# Patient Record
Sex: MALE | Race: WHITE | NOT HISPANIC OR LATINO | Employment: STUDENT | URBAN - METROPOLITAN AREA
[De-identification: names, ages, dates, MRNs, and addresses within clinical notes are randomized per-mention and may not be internally consistent; named-entity substitution may affect disease eponyms.]

---

## 2023-09-05 PROBLEM — H52.10 MYOPIA: Status: ACTIVE | Noted: 2023-09-05

## 2023-12-26 ENCOUNTER — APPOINTMENT (OUTPATIENT)
Dept: OPHTHALMOLOGY | Facility: CLINIC | Age: 18
End: 2023-12-26
Payer: COMMERCIAL

## 2023-12-26 ENCOUNTER — OFFICE VISIT (OUTPATIENT)
Dept: OPHTHALMOLOGY | Facility: CLINIC | Age: 18
End: 2023-12-26
Payer: COMMERCIAL

## 2023-12-26 ENCOUNTER — CLINICAL SUPPORT (OUTPATIENT)
Dept: OPHTHALMOLOGY | Facility: CLINIC | Age: 18
End: 2023-12-26
Payer: COMMERCIAL

## 2023-12-26 DIAGNOSIS — H01.00A BLEPHARITIS OF UPPER AND LOWER EYELIDS OF BOTH EYES, UNSPECIFIED TYPE: ICD-10-CM

## 2023-12-26 DIAGNOSIS — H52.213 IRREGULAR ASTIGMATISM, BILATERAL: ICD-10-CM

## 2023-12-26 DIAGNOSIS — H01.00B BLEPHARITIS OF UPPER AND LOWER EYELIDS OF BOTH EYES, UNSPECIFIED TYPE: ICD-10-CM

## 2023-12-26 DIAGNOSIS — H52.13 MYOPIA OF BOTH EYES: Primary | ICD-10-CM

## 2023-12-26 PROCEDURE — 99214 OFFICE O/P EST MOD 30 MIN: CPT | Performed by: OPHTHALMOLOGY

## 2023-12-26 PROCEDURE — 92015 DETERMINE REFRACTIVE STATE: CPT | Performed by: OPHTHALMOLOGY

## 2023-12-26 RX ORDER — ALBUTEROL SULFATE 90 UG/1
2 AEROSOL, METERED RESPIRATORY (INHALATION)
COMMUNITY
Start: 2023-08-08

## 2023-12-26 RX ORDER — MONTELUKAST SODIUM 10 MG/1
10 TABLET ORAL
COMMUNITY
Start: 2022-06-17

## 2023-12-26 ASSESSMENT — ENCOUNTER SYMPTOMS
EYES NEGATIVE: 0
CARDIOVASCULAR NEGATIVE: 0
PSYCHIATRIC NEGATIVE: 0
OCCASIONAL FEELINGS OF UNSTEADINESS: 0
MUSCULOSKELETAL NEGATIVE: 0
ALLERGIC/IMMUNOLOGIC NEGATIVE: 0
HEMATOLOGIC/LYMPHATIC NEGATIVE: 0
LOSS OF SENSATION IN FEET: 0
NEUROLOGICAL NEGATIVE: 0
ENDOCRINE NEGATIVE: 0
DEPRESSION: 0
GASTROINTESTINAL NEGATIVE: 0
CONSTITUTIONAL NEGATIVE: 0
RESPIRATORY NEGATIVE: 0

## 2023-12-26 ASSESSMENT — REFRACTION_WEARINGRX
SPECS_TYPE: SVL
OD_AXIS: 016
OS_CYLINDER: -4.50
OD_CYLINDER: -4.00
OD_SPHERE: +0.25
OS_AXIS: 176
OS_SPHERE: +2.50

## 2023-12-26 ASSESSMENT — VISUAL ACUITY
OD_SC: 20/20
OS_SC: 20/20
CORRECTION_TYPE: GLASSES
METHOD: SNELLEN - SINGLE

## 2023-12-26 ASSESSMENT — KERATOMETRY
OD_AXISANGLE2_DEGREES: 5
OS_K1POWER_DIOPTERS: 42.50
OS_AXISANGLE2_DEGREES: 175
OD_K1POWER_DIOPTERS: 42.50
METHOD_AUTO_MANUAL: AUTOMATED
OS_AXISANGLE_DEGREES: 85
OS_K2POWER_DIOPTERS: 46.50
OD_AXISANGLE_DEGREES: 95
OD_K2POWER_DIOPTERS: 45.75

## 2023-12-26 ASSESSMENT — REFRACTION_MANIFEST
OS_SPHERE: +2.50
OS_AXIS: 175
OD_CYLINDER: -3.50
OS_CYLINDER: -4.75
OD_SPHERE: +0.25
OD_AXIS: 010

## 2023-12-26 ASSESSMENT — TONOMETRY
IOP_METHOD: GOLDMANN APPLANATION
OS_IOP_MMHG: 15
OD_IOP_MMHG: 15

## 2023-12-26 ASSESSMENT — EXTERNAL EXAM - LEFT EYE: OS_EXAM: NORMAL

## 2023-12-26 ASSESSMENT — CUP TO DISC RATIO
OS_RATIO: 0.3
OD_RATIO: 0.3

## 2023-12-26 ASSESSMENT — EXTERNAL EXAM - RIGHT EYE: OD_EXAM: NORMAL

## 2023-12-26 ASSESSMENT — PATIENT HEALTH QUESTIONNAIRE - PHQ9
2. FEELING DOWN, DEPRESSED OR HOPELESS: NOT AT ALL
SUM OF ALL RESPONSES TO PHQ9 QUESTIONS 1 AND 2: 0
1. LITTLE INTEREST OR PLEASURE IN DOING THINGS: NOT AT ALL

## 2023-12-26 ASSESSMENT — SLIT LAMP EXAM - LIDS
COMMENTS: 1+ BLEPHARITIS
COMMENTS: 1+ BLEPHARITIS

## 2023-12-26 ASSESSMENT — PAIN SCALES - GENERAL: PAINLEVEL: 0-NO PAIN

## 2023-12-26 NOTE — PROGRESS NOTES
Subjective   Patient ID: Girish Kincaid is a 18 y.o. male.    Chief Complaint    Annual Exam       HPI    No visual acuity (VA) complaints    Complete exam.  No new changes in health history or meds.  Vision is good and stable.  No new problems or complaints.      Last edited by Oh Moss MD on 12/26/2023  3:14 PM.        No current outpatient medications on file. (Ophthalmology pharm classes)       Current Outpatient Medications (Other)   Medication Sig Dispense Refill    albuterol 90 mcg/actuation inhaler Inhale 2 puffs.      montelukast (Singulair) 10 mg tablet Take 1 tablet (10 mg) by mouth.         Objective   Base Eye Exam       Visual Acuity (Snellen - Single)         Right Left    Dist sc 20/20 20/20      Correction: Glasses              Tonometry (Goldmann Applanation, 2:15 PM)         Right Left    Pressure 15 15              Pupils         Dark Shape React APD    Right 4 Round 2 None    Left 4 Round 2 None              Extraocular Movement         Right Left     Full Full              Dilation       Both eyes: 1% Tropic 2.5% Phen @ 2:15 PM                  Additional Tests       Keratometry (Automated)         K1 Axis K2 Axis    Right 42.50 5 45.75 95    Left 42.50 175 46.50 85                  Slit Lamp and Fundus Exam       External Exam         Right Left    External Normal Normal              Slit Lamp Exam         Right Left    Lids/Lashes 1+ Blepharitis 1+ Blepharitis    Conjunctiva/Sclera normal bulbar and palepbral conjunctiva normal bulbar and palepbral conjunctiva    Cornea normal epi/stroma/endo and tear film normal epi/stroma/endo and tear film    Anterior Chamber normal anterior chamber, deep and quiet normal anterior chamber, deep and quiet    Iris iris normal iris normal    Lens normal clear lens capsule/cortex/nucleus normal clear lens capsule/cortex/nucleus    Anterior Vitreous vitreous clear and normal vitreous clear and normal              Fundus Exam         Right  Left    Disc normal optic nerve normal optic nerve    C/D Ratio 0.3 0.3    Macula normal macula normal macula    Vessels normal retinal vessels normal retinal vessels    Periphery normal retinal periphery normal retinal periphery    Very brief glimpse OU.                  Refraction       Wearing Rx         Sphere Cylinder Axis    Right +0.25 -4.00 016    Left +2.50 -4.50 176      Type: SVL              Manifest Refraction         Sphere Cylinder Axis    Right +0.25 -3.50 010    Left +2.50 -4.75 175              Final Rx         Sphere Cylinder [place] Dist VA    Right -0.00 -3.75 010 20/20    Left +2.50 -4.50 170 20/20      Type: distance    Expiration Date: 12/26/2025    Pupillary Distance: 62                    Assessment/Plan   Problem List Items Addressed This Visit          Eye/Vision problems    Myopia - Primary    Irregular astigmatism, bilateral    Blepharitis of upper and lower eyelids of both eyes

## 2024-12-30 ENCOUNTER — APPOINTMENT (OUTPATIENT)
Dept: OPHTHALMOLOGY | Facility: CLINIC | Age: 19
End: 2024-12-30
Payer: COMMERCIAL

## 2024-12-30 DIAGNOSIS — H01.02B SQUAMOUS BLEPHARITIS OF UPPER AND LOWER EYELIDS OF BOTH EYES: Primary | ICD-10-CM

## 2024-12-30 DIAGNOSIS — H52.31 ANISOMETROPIA: ICD-10-CM

## 2024-12-30 DIAGNOSIS — H01.02A SQUAMOUS BLEPHARITIS OF UPPER AND LOWER EYELIDS OF BOTH EYES: Primary | ICD-10-CM

## 2024-12-30 DIAGNOSIS — H52.213 IRREGULAR ASTIGMATISM, BILATERAL: ICD-10-CM

## 2024-12-30 DIAGNOSIS — H52.13 MYOPIA OF BOTH EYES: ICD-10-CM

## 2024-12-30 PROCEDURE — 99214 OFFICE O/P EST MOD 30 MIN: CPT | Performed by: OPHTHALMOLOGY

## 2024-12-30 PROCEDURE — 92015 DETERMINE REFRACTIVE STATE: CPT | Performed by: OPHTHALMOLOGY

## 2024-12-30 ASSESSMENT — REFRACTION_MANIFEST
OD_SPHERE: -0.25
METHOD_AUTOREFRACTION: 1
OD_SPHERE: -0.25
OD_CYLINDER: -3.50
OS_CYLINDER: -4.75
OS_SPHERE: +2.50
OS_AXIS: 175
OS_AXIS: 175
OD_CYLINDER: -3.50
OD_AXIS: 010
OS_CYLINDER: -4.50
OS_SPHERE: +2.00
OD_AXIS: 010

## 2024-12-30 ASSESSMENT — SLIT LAMP EXAM - LIDS
COMMENTS: 1+ BLEPHARITIS
COMMENTS: 1+ BLEPHARITIS

## 2024-12-30 ASSESSMENT — KERATOMETRY
OS_K2POWER_DIOPTERS: 47.00
OD_K1POWER_DIOPTERS: 42.75
OS_AXISANGLE_DEGREES: 85
OS_AXISANGLE2_DEGREES: 175
OD_K2POWER_DIOPTERS: 45.75
OD_AXISANGLE_DEGREES: 95
OD_AXISANGLE2_DEGREES: 5
OS_K1POWER_DIOPTERS: 42.75

## 2024-12-30 ASSESSMENT — CUP TO DISC RATIO
OS_RATIO: 0.3
OD_RATIO: 0.3

## 2024-12-30 ASSESSMENT — VISUAL ACUITY
OS_CC: 20/20
OS_CC+: -2
CORRECTION_TYPE: GLASSES
METHOD: SNELLEN - LINEAR
OD_CC: 20/20

## 2024-12-30 ASSESSMENT — ENCOUNTER SYMPTOMS
NEUROLOGICAL NEGATIVE: 0
PSYCHIATRIC NEGATIVE: 0
HEMATOLOGIC/LYMPHATIC NEGATIVE: 0
RESPIRATORY NEGATIVE: 0
EYES NEGATIVE: 0
ENDOCRINE NEGATIVE: 0
GASTROINTESTINAL NEGATIVE: 0
ALLERGIC/IMMUNOLOGIC NEGATIVE: 0
CARDIOVASCULAR NEGATIVE: 0
MUSCULOSKELETAL NEGATIVE: 0
CONSTITUTIONAL NEGATIVE: 0

## 2024-12-30 ASSESSMENT — TONOMETRY
IOP_METHOD: GOLDMANN APPLANATION
OS_IOP_MMHG: 12
OD_IOP_MMHG: 10

## 2024-12-30 ASSESSMENT — PATIENT HEALTH QUESTIONNAIRE - PHQ9
1. LITTLE INTEREST OR PLEASURE IN DOING THINGS: NOT AT ALL
SUM OF ALL RESPONSES TO PHQ9 QUESTIONS 1 AND 2: 0
2. FEELING DOWN, DEPRESSED OR HOPELESS: NOT AT ALL

## 2024-12-30 ASSESSMENT — REFRACTION_WEARINGRX
OD_SPHERE: +0.00
OD_CYLINDER: -3.75
OD_AXIS: 003
OS_CYLINDER: -4.50
OS_AXIS: 162
SPECS_TYPE: DISTANCE
OS_SPHERE: +2.50

## 2024-12-30 ASSESSMENT — PAIN SCALES - GENERAL: PAINLEVEL_OUTOF10: 0-NO PAIN

## 2024-12-30 ASSESSMENT — EXTERNAL EXAM - RIGHT EYE: OD_EXAM: NORMAL

## 2024-12-30 ASSESSMENT — EXTERNAL EXAM - LEFT EYE: OS_EXAM: NORMAL

## 2024-12-30 NOTE — PROGRESS NOTES
Subjective   Patient ID: Girish Kincaid is a 19 y.o. male.    Chief Complaint    Annual Exam       HPI    Complete exam.  No new problems or complaints.  Vision is excellent.  No changes in health history or meds.   Last edited by Oh Moss MD on 12/30/2024  4:11 PM.        No current outpatient medications on file. (Ophthalmology pharm classes)       Current Outpatient Medications (Other)   Medication Sig Dispense Refill    albuterol 90 mcg/actuation inhaler Inhale 2 puffs.      montelukast (Singulair) 10 mg tablet Take 1 tablet (10 mg) by mouth.         Objective   Base Eye Exam       Visual Acuity (Snellen - Linear)         Right Left    Dist cc 20/20 20/20 -2      Correction: Glasses              Tonometry (Goldmann Applanation, 2:50 PM)         Right Left    Pressure 10 12              Pupils         Dark Shape React APD    Right 4 Round 1 None    Left 4 Round 1 None              Extraocular Movement         Right Left     Full Full              Dilation       Both eyes: 1% Tropic 2.5% Phen @ 2:50 PM                  Additional Tests       Keratometry         K1 Axis K2 Axis    Right 42.75 5 45.75 95    Left 42.75 175 47.00 85                  Slit Lamp and Fundus Exam       External Exam         Right Left    External Normal Normal              Slit Lamp Exam         Right Left    Lids/Lashes 1+ Blepharitis 1+ Blepharitis    Conjunctiva/Sclera normal bulbar and palepbral conjunctiva normal bulbar and palepbral conjunctiva    Cornea normal epi/stroma/endo and tear film normal epi/stroma/endo and tear film    Anterior Chamber normal anterior chamber, deep and quiet normal anterior chamber, deep and quiet    Iris iris normal iris normal    Lens normal clear lens capsule/cortex/nucleus normal clear lens capsule/cortex/nucleus    Anterior Vitreous vitreous clear and normal vitreous clear and normal              Fundus Exam         Right Left    Disc normal optic nerve normal optic nerve    C/D  Ratio 0.3 0.3    Macula normal macula normal macula    Vessels normal retinal vessels normal retinal vessels    Periphery normal retinal periphery normal retinal periphery                  Refraction       Wearing Rx         Sphere Cylinder Axis    Right +0.00 -3.75 003    Left +2.50 -4.50 162      Type: DISTANCE              Manifest Refraction (Auto)         Sphere Cylinder Axis Dist VA    Right -0.25 -3.50 010     Left +2.50 -4.75 175               Manifest Refraction #2 (Subjective)         Sphere Cylinder Axis Dist VA    Right -0.25 -3.50 010 20/20    Left +2.00 -4.50 175 20/20      Pupillary Distance: 61              Final Rx         Sphere Cylinder Axis Dist VA    Right -0.25 -3.50 010 20/20    Left +2.00 -4.50 175 20/20      Expiration Date: 12/30/2026    Pupillary Distance: 61                    Assessment/Plan   Problem List Items Addressed This Visit          Eye/Vision problems    Myopia    Irregular astigmatism, bilateral    Blepharitis of upper and lower eyelids of both eyes - Primary    Anisometropia     F/u one year full.

## 2026-01-02 ENCOUNTER — APPOINTMENT (OUTPATIENT)
Dept: OPHTHALMOLOGY | Facility: CLINIC | Age: 21
End: 2026-01-02
Payer: COMMERCIAL